# Patient Record
Sex: FEMALE | Race: WHITE | HISPANIC OR LATINO | Employment: OTHER | URBAN - METROPOLITAN AREA
[De-identification: names, ages, dates, MRNs, and addresses within clinical notes are randomized per-mention and may not be internally consistent; named-entity substitution may affect disease eponyms.]

---

## 2018-07-09 ENCOUNTER — TRANSFERRED RECORDS (OUTPATIENT)
Dept: HEALTH INFORMATION MANAGEMENT | Facility: CLINIC | Age: 33
End: 2018-07-09

## 2018-07-12 ENCOUNTER — TRANSFERRED RECORDS (OUTPATIENT)
Dept: HEALTH INFORMATION MANAGEMENT | Facility: CLINIC | Age: 33
End: 2018-07-12

## 2018-07-31 ENCOUNTER — TRANSFERRED RECORDS (OUTPATIENT)
Dept: HEALTH INFORMATION MANAGEMENT | Facility: CLINIC | Age: 33
End: 2018-07-31

## 2018-09-11 ENCOUNTER — OFFICE VISIT (OUTPATIENT)
Dept: CARDIOLOGY | Facility: CLINIC | Age: 33
End: 2018-09-11
Attending: GENETIC COUNSELOR, MS
Payer: COMMERCIAL

## 2018-09-11 ENCOUNTER — TRANSFERRED RECORDS (OUTPATIENT)
Dept: HEALTH INFORMATION MANAGEMENT | Facility: CLINIC | Age: 33
End: 2018-09-11

## 2018-09-11 DIAGNOSIS — Z84.81 FAMILY HISTORY OF CARRIER OF GENETIC DISEASE: ICD-10-CM

## 2018-09-11 DIAGNOSIS — R55 SYNCOPE, CARDIOGENIC: ICD-10-CM

## 2018-09-11 DIAGNOSIS — I44.1 SECOND DEGREE ATRIOVENTRICULAR BLOCK: Primary | ICD-10-CM

## 2018-09-11 PROCEDURE — 96040 ZZH GENETIC COUNSELING, EACH 30 MINUTES: CPT | Mod: ZF | Performed by: GENETIC COUNSELOR, MS

## 2018-09-11 NOTE — MR AVS SNAPSHOT
"              After Visit Summary   2018    Rosana Flores    MRN: [id number]           Patient Information     Date Of Birth          1985        Visit Information        Provider Department      2018 2:30 PM Rufina Cain [facility]         Follow-ups after your visit        Who to contact     If you have questions or need follow up information about today's clinic visit or your schedule please contact [facility] directly at [phone].  Normal or non-critical lab and imaging results will be communicated to you by MyChart, letter or phone within 4 business days after the clinic has received the results. If you do not hear from us within 7 days, please contact the clinic through [employer] or phone. If you have a critical or abnormal lab result, we will notify you by phone as soon as possible.  Submit refill requests through [employer] or call your pharmacy and they will forward the refill request to us. Please allow 3 business days for your refill to be completed.          Additional Information About Your Visit        [employer] Information     [employer] lets you send messages to your doctor, view your test results, renew your prescriptions, schedule appointments and more. To sign up, go to [email or web address] . Click on \"Log in\" on the left side of the screen, which will take you to the Welcome page. Then click on \"Sign up Now\" on the right side of the page.     You will be asked to enter the access code listed below, as well as some personal information. Please follow the directions to create your username and password.     Your access code is: [email or web address]  Expires: 2018  6:30 AM     Your access code will  in 90 days. If you need help or a new code, please call your [facility] clinic or [phone].        Care EveryWhere ID     This is your Care EveryWhere ID. This could be used by other organizations to access your [facility] medical " records  [id number]         Blood Pressure from Last 3 Encounters:   No data found for BP    Weight from Last 3 Encounters:   No data found for Wt              Today, you had the following     No orders found for display       Primary Care Provider Office Phone # Fax #    Deepika Duncan PA-C [phone]       [facility] [address] [facility] [address]        Equal Access to Services     AMELIA LIVE : Hadii aad ku hadasho Soomaali, waaxda luqadaha, qaybta kaalmada adeegyada, waxay idiin hayaan adeeg kharash la'aan ah. So [facility] [phone].    ATENCIÓN: Si habla español, tiene a merritt disposición servicios gratuitos de asistencia lingüística. Llame al [phone].    We comply with applicable federal civil rights laws and [place] laws. We do not discriminate on the basis of race, color, national origin, age, disability, sex, sexual orientation, or gender identity.            Thank you!     Thank you for choosing [facility]  for your care. Our goal is always to provide you with excellent care. Hearing back from our patients is one way we can continue to improve our services. Please take a few minutes to complete the written survey that you may receive in the mail after your visit with us. Thank you!             Your Updated Medication List - Protect others around you: Learn how to safely use, store and throw away your medicines at www.disposemymeds.org.      Notice  As of 9/11/2018 11:59 PM    You have not been prescribed any medications.

## 2018-09-11 NOTE — LETTER
2018      RE: Rosana Flores  [address] [facility] [address] [facility] [address]       Dear Colleague,    Thank you for the opportunity to participate in the care of your patient, Rosana Flores, at the [facility]. Please see a copy of my visit note below.    Here is a copy of the progress note from your recent genetic counseling visit [facility] on Date: 2018.    PROGRESS NOTE: Rosana was referred by Dr. Drummond at [facility] ([facility]) for genetic counseling due to the results of her genetic testing.  I had the opportunity to meet with Rosana today to discuss the genetic test results and the impact for herself and her family.    MEDICAL HISTORY: Rosana reports that she was seen in cardiology at [facility] this spring after experiencing several episodes of syncope.  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  Her history is also  remarkable for a diagnosis of petit mal seizures in childhood, but has not been an issue in years, and depression.      Given the differential diagnosis of underlying channelopathies, history of childhood seizures and family history of psychiatric disorders Dr. Drummond ordered genetic testing which was performed at "[facility] laboratory and revealed variants of unknown significance in the AKAP9, DEPDC5, LAMA4, and MYH7 genes.     FAMILY HISTORY: A detailed family history was obtained at office visit on 2018.  (Please see scanned pedigree for details).  Family history was significant for the following: Rosana's has two children in overall good health.  Her younger daughter has ocular albinism.  Rosana's brother  at 27 years by suicide.  Her younger sister is in good health.  Rosana's father is prediabetic and has high blood pressure. His father  suddenly in this 40's but no details are known about the cause of death.  A paternal uncle  also  in his 40-50's but no details are known.  Rosana's mother had breast cancer in her 20's.  She has mental health issues and an autoimmune disease.  Rosana's maternal uncle  in his 30's after a fall from a roof and resulting blood clot.  Maternal grandmother also had breast cancer.  She was diagnosed in her 40's and  in that decade by suicide. There is no known history of cardiomyopathy, arrythmias, heart attacks, fainting, sudden cardiac death, genetic conditions, or birth defects.     DISCUSSION:  Fainting can be caused by genetic cardiomyopathies and arrhythmias which can result from both genetic and non-genetic factors.  Given the possibility of a genetic component, Rosana had genetic testing which revealed four variants of unknown significance in the AKAP9, DEPDC5, LAMA4, and MYH7 genes.    A variant of unknown significance (VUS) is a genetic change in which there is not enough information known to determine if it is associated with disease or not.  In order to help determine the classification of a variant we look at publications, presence in population database, computer prediction models, and similarity of the change to normal sequence.     AKAP9 (c.5035 C>T) - This gene currently has no well established disease association but there is preliminary evidence that it may be associated with Long QT syndrome.  This change occurs in a position that is moderately conserved across species and creates a large physiochemical difference in the resulting amino acid structure.  It is present in population databases at a low level (0.006%).  Computer models are conflicting, some predict that this change is deleterious, damaging, and unlikely to interfere with function.  Therefore, evidence is insufficient to determine if it is related to disease or not.    DEPDC5 (c.6 A>C) - This gene is associated with familial focal epilepsy and nocturnal frontal lobe epilepsy.  This change occurs in a position that  is highly conserved across species (meaning that it does not change across species) and creates a moderate physiochemical difference in the resulting amino acid structure.  It is present in population databases at a low level (0.01%).  It has been observed in someone with Rolandic Epilepsy.  Computer models are conflicting, some predict that this change is tolerated, not available, and unlikely to interfere with function.  Therefore, evidence is insufficient to determine if it is related to disease or not.    LAMA4 (c.343G>A) - This gene currently has no well established disease association but there is preliminary evidence that it may be associated with dilated cardiomyopathy.  This change occurs in a position that is moderately conserved across species and creates a small physiochemical difference in the resulting amino acid structure.  It is present in population databases at a low level (0.001%).  Computer models are predict that this change is tolerated, benign, and unlikely to interfere with function.  Therefore, evidence is insufficient to determine if it is related to disease or not.    MYH7 (c.658 A>C) - This gene is associated with hypertrophic cardiomyopathy, dilated cardiomyopathy, LV non-compaction cardiomyopathy, Jr distal myopathy, and myosin storage myopathy.  This change occurs in a position that is highly conserved across species and creates a small physiochemical difference in the resulting amino acid structure.  It is not present in population databases.  Computer models are conflicting, some predict that this change is deleterious, probably damaging, and unlikely to interfere with function.  This change occurs in a portion of the gene that has many other known mutations found in individuals with hypertropic cardiomyopathy. Evidence is insufficient to determine if it is related to disease or not but this change seems to be the most concerning of the variants.    These results do not provide  answers regarding Rosana but may give some direction for future screening to assess for cardiomyopathies, LQTS, and neurological assessment for inherited epilepsy.    We know that all of these gene  variants are inherited in an autosomal dominant (AD) pattern. Autosomal refers to the location of the gene on a chromosome shared by both males and females.  Dominant refers to the number of gene mutations required to cause the disease.  In the case of a dominant condition, one of the genes is abnormal or broken and the other is normal.  The one broken gene prevents normal development and leads to the disease.        When an individual with an autosomal dominant condition has children there is a 50% chance of passing on the abnormal gene and a 50% chance of passing along the normal gene to each child. Which gene is passed on is a random process that occurs at the time of conception. If a child does NOT inherit the gene, they are not at risk for passing it on in future generations.      Given these results of this testing, genetic screening is not recommended in family members at this time due to the uncertainty of its significance.  Testing could be offered in Rosana's parents to determine if any of these variants are new in Rosana.  However, these variants do NOT qualify for the complimentary family studies.      Although genetic testing is NOT recommended in family members, we do recommend clinical screening in other family members.  Screening may include ECHO, EKG, heart monitor, and cardiology evaluation.  Details of the recommendations can be discussed with your cardiologist.  Screening in other family members could provide more information about Art's diagnosis.      Reviewed possibility of reduced penetrance and variable expressivity, which could help explain why other family members haven't had the same symptoms as Rosana.  Reduced penetrance means that not everyone who inherits the gene develops symptoms.   Variable expressivity means that the age of onset and severity of disease can differ, even within the same family.        All questions answered at this time.     PLAN: Rosana plans to continue care with Dr. Drummond and other providers at [facility].  Given the uncertainty of her diagnosis, she plans to also meet with a neurologist to see if any of her symptoms are related to her history of petit mal seizures.  At that time she will also review her genetic test results with the.  She will notify me of any updates. A copy of lab results were provided to Rosana.    TOTAL TIME SPENT IN COUNSELIN minutes    Rufina Cain MS  Licensed Genetic Counselor  [facility]

## 2018-09-12 NOTE — PROGRESS NOTES
Here is a copy of the progress note from your recent genetic counseling visit to [facility] on Date: 2018.    PROGRESS NOTE: Rosana was referred by Dr. Drummond at [facility] ([facility]) for genetic counseling due to the results of her genetic testing.  I had the opportunity to meet with Rosana today to discuss the genetic test results and the impact for herself and her family.    MEDICAL HISTORY: Rosana reports that she was seen in cardiology at [facility] this spring after experiencing several episodes of syncope.  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  Her history is also  remarkable for a diagnosis of petit mal seizures in childhood, but has not been an issue in years, and depression.      Given the differential diagnosis of underlying channelopathies, history of childhood seizures and family history of psychiatric disorders Dr. Drummond ordered genetic testing which was performed at [email or web address] and revealed variants of unknown significance in the AKAP9, DEPDC5, LAMA4, and MYH7 genes.     FAMILY HISTORY: A detailed family history was obtained at office visit on 2018.  (Please see scanned pedigree for details).  Family history was significant for the following: Adis has two children in overall good health.  Her younger daughter has ocular albinism.  Rosana's brother  at 27 years by suicide.  Her younger sister is in good health.  Rosana's father is prediabetic and has high blood pressure. His father  suddenly in this 40's but no details are known about the cause of death.  A paternal uncle also  in his 40-50's but no details are known.  Rosana's mother had breast cancer in her 20's.  She has mental health issues and an autoimmune disease.  Rosana's maternal uncle  in his 30's after a fall from a roof and resulting blood clot.  Maternal grandmother also had breast cancer.  She was diagnosed  in her 40's and  in that decade by suicide. There is no known history of cardiomyopathy, arrythmias, heart attacks, fainting, sudden cardiac death, genetic conditions, or birth defects.     DISCUSSION:  Fainting can be caused by genetic cardiomyopathies and arrhythmias which can result from both genetic and non-genetic factors.  Given the possibility of a genetic component, Rosana had genetic testing which revealed four variants of unknown significance in the AKAP9, DEPDC5, LAMA4, and MYH7 genes.    A variant of unknown significance (VUS) is a genetic change in which there is not enough information known to determine if it is associated with disease or not.  In order to help determine the classification of a variant we look at publications, presence in population database, computer prediction models, and similarity of the change to normal sequence.     AKAP9 (c.5035 C>T) - This gene currently has no well established disease association but there is preliminary evidence that it may be associated with Long QT syndrome.  This change occurs in a position that is moderately conserved across species and creates a large physiochemical difference in the resulting amino acid structure.  It is present in population databases at a low level (0.006%).  Computer models are conflicting, some predict that this change is deleterious, damaging, and unlikely to interfere with function.  Therefore, evidence is insufficient to determine if it is related to disease or not.    DEPDC5 (c.2066 A>C) - This gene is associated with familial focal epilepsy and nocturnal frontal lobe epilepsy.  This change occurs in a position that is highly conserved across species (meaning that it does not change across species) and creates a moderate physiochemical difference in the resulting amino acid structure.  It is present in population databases at a low level (0.01%).  It has been observed in someone with Rolandic Epilepsy.  Computer models are  conflicting, some predict that this change is tolerated, not available, and unlikely to interfere with function.  Therefore, evidence is insufficient to determine if it is related to disease or not.    LAMA4 (c.343G>A) - This gene currently has no well established disease association but there is preliminary evidence that it may be associated with dilated cardiomyopathy.  This change occurs in a position that is moderately conserved across species and creates a small physiochemical difference in the resulting amino acid structure.  It is present in population databases at a low level (0.001%).  Computer models are predict that this change is tolerated, benign, and unlikely to interfere with function.  Therefore, evidence is insufficient to determine if it is related to disease or not.    MYH7 (c.658 A>C) - This gene is associated with hypertrophic cardiomyopathy, dilated cardiomyopathy, LV non-compaction cardiomyopathy, Jr distal myopathy, and myosin storage myopathy.  This change occurs in a position that is highly conserved across species and creates a small physiochemical difference in the resulting amino acid structure.  It is not present in population databases.  Computer models are conflicting, some predict that this change is deleterious, probably damaging, and unlikely to interfere with function.  This change occurs in a portion of the gene that has many other known mutations found in individuals with hypertropic cardiomyopathy. Evidence is insufficient to determine if it is related to disease or not but this change seems to be the most concerning of the variants.    These results do not provide answers regarding Rosana but may give some direction for future screening to assess for cardiomyopathies, LQTS, and neurological assessment for inherited epilepsy.    We know that all of these gene variants are inherited in an autosomal dominant (AD) pattern. Autosomal refers to the location of the gene on a  chromosome shared by both males and females.  Dominant refers to the number of gene mutations required to cause the disease.  In the case of a dominant condition, one of the genes is abnormal or broken and the other is normal.  The one broken gene prevents normal development and leads to the disease.        When an individual with an autosomal dominant condition has children there is a 50% chance of passing on the abnormal gene and a 50% chance of passing along the normal gene to each child. Which gene is passed on is a random process that occurs at the time of conception. If a child does NOT inherit the gene, they are not at risk for passing it on in future generations.      Given these results of this testing, genetic screening is not recommended in family members at this time due to the uncertainty of its significance.  Testing could be offered in Rosana's parents to determine if any of these variants are new in Rosana.  However, these variants do NOT qualify for the complimentary family studies.      Although genetic testing is NOT recommended in family members, we do recommend clinical screening in other family members.  Screening may include ECHO, EKG, heart monitor, and cardiology evaluation.  Details of the recommendations can be discussed with your cardiologist.  Screening in other family members could provide more information about Art's diagnosis.      Reviewed possibility of reduced penetrance and variable expressivity, which could help explain why other family members haven't had the same symptoms as Rosana.  Reduced penetrance means that not everyone who inherits the gene develops symptoms.  Variable expressivity means that the age of onset and severity of disease can differ, even within the same family.        All questions answered at this time.     PLAN: Rosana plans to continue care with Dr. Drummond and other providers at [facility].  Given the uncertainty of her diagnosis, she plans to also meet with a  neurologist to see if any of her symptoms are related to her history of petit mal seizures.  At that time she will also review her genetic test results with the.  She will notify me of any updates. A copy of lab results were provided to Rosana.    TOTAL TIME SPENT IN COUNSELIN minutes    Rufina Cain MS  Licensed Genetic Counselor  [facility]

## 2021-05-28 ENCOUNTER — RECORDS - HEALTHEAST (OUTPATIENT)
Dept: ADMINISTRATIVE | Facility: CLINIC | Age: 36
End: 2021-05-28

## 2021-05-31 ENCOUNTER — RECORDS - HEALTHEAST (OUTPATIENT)
Dept: ADMINISTRATIVE | Facility: CLINIC | Age: 36
End: 2021-05-31

## 2021-06-01 ENCOUNTER — RECORDS - HEALTHEAST (OUTPATIENT)
Dept: ADMINISTRATIVE | Facility: CLINIC | Age: 36
End: 2021-06-01

## 2021-06-02 ENCOUNTER — RECORDS - HEALTHEAST (OUTPATIENT)
Dept: ADMINISTRATIVE | Facility: CLINIC | Age: 36
End: 2021-06-02

## 2021-06-16 PROBLEM — I95.1 ORTHOSTATIC HYPOTENSION: Status: ACTIVE | Noted: 2018-09-24

## 2021-06-16 PROBLEM — F90.2 ADHD (ATTENTION DEFICIT HYPERACTIVITY DISORDER), COMBINED TYPE: Status: ACTIVE | Noted: 2018-10-01

## 2021-06-16 PROBLEM — I49.5 TACHY-BRADY SYNDROME (H): Status: ACTIVE | Noted: 2018-09-24

## 2021-06-16 PROBLEM — Z00.00 ROUTINE PHYSICAL EXAMINATION: Status: ACTIVE | Noted: 2017-07-19

## 2021-06-16 PROBLEM — I44.1 AV BLOCK, 2ND DEGREE: Status: ACTIVE | Noted: 2018-05-25

## 2021-06-16 PROBLEM — Z95.0 PACEMAKER: Status: ACTIVE | Noted: 2018-07-09

## 2021-06-16 PROBLEM — R07.9 CHEST PAIN: Status: ACTIVE | Noted: 2018-09-24

## 2021-06-16 PROBLEM — Z97.5 IUD (INTRAUTERINE DEVICE) IN PLACE: Status: ACTIVE | Noted: 2017-07-19

## 2021-06-16 PROBLEM — R55 VASOVAGAL SYNCOPE: Status: ACTIVE | Noted: 2018-05-25

## 2021-06-16 PROBLEM — F32.A DEPRESSION: Status: ACTIVE | Noted: 2018-05-25

## 2022-11-15 ENCOUNTER — HOSPITAL ENCOUNTER (EMERGENCY)
Facility: CLINIC | Age: 37
Discharge: HOME | End: 2022-11-15
Attending: EMERGENCY MEDICINE | Admitting: EMERGENCY MEDICINE

## 2022-11-15 ENCOUNTER — APPOINTMENT (OUTPATIENT)
Dept: RADIOLOGY | Facility: CLINIC | Age: 37
End: 2022-11-15
Attending: EMERGENCY MEDICINE

## 2022-11-15 VITALS
HEART RATE: 71 BPM | DIASTOLIC BLOOD PRESSURE: 69 MMHG | BODY MASS INDEX: 28.17 KG/M2 | RESPIRATION RATE: 16 BRPM | OXYGEN SATURATION: 97 % | SYSTOLIC BLOOD PRESSURE: 109 MMHG | TEMPERATURE: 99.3 F | WEIGHT: 165 LBS | HEIGHT: 64 IN

## 2022-11-15 DIAGNOSIS — M25.511 ACUTE PAIN OF RIGHT SHOULDER: ICD-10-CM

## 2022-11-15 LAB
ANION GAP SERPL CALCULATED.3IONS-SCNC: 8 MMOL/L (ref 5–18)
ATRIAL RATE - MUSE: 76 BPM
BASOPHILS # BLD AUTO: 0.1 10E3/UL (ref 0–0.2)
BASOPHILS NFR BLD AUTO: 1 %
BUN SERPL-MCNC: 15 MG/DL (ref 8–22)
CALCIUM SERPL-MCNC: 9.2 MG/DL (ref 8.5–10.5)
CHLORIDE BLD-SCNC: 105 MMOL/L (ref 98–107)
CO2 SERPL-SCNC: 25 MMOL/L (ref 22–31)
CREAT SERPL-MCNC: 0.76 MG/DL (ref 0.6–1.1)
DIASTOLIC BLOOD PRESSURE - MUSE: NORMAL MMHG
EOSINOPHIL # BLD AUTO: 0.4 10E3/UL (ref 0–0.7)
EOSINOPHIL NFR BLD AUTO: 4 %
ERYTHROCYTE [DISTWIDTH] IN BLOOD BY AUTOMATED COUNT: 11.7 % (ref 10–15)
GFR SERPL CREATININE-BSD FRML MDRD: >90 ML/MIN/1.73M2
GLUCOSE BLD-MCNC: 89 MG/DL (ref 70–125)
HCG SERPL QL: NEGATIVE
HCT VFR BLD AUTO: 45.9 % (ref 35–47)
HGB BLD-MCNC: 15.7 G/DL (ref 11.7–15.7)
HOLD SPECIMEN: NORMAL
IMM GRANULOCYTES # BLD: 0 10E3/UL
IMM GRANULOCYTES NFR BLD: 0 %
INTERPRETATION ECG - MUSE: NORMAL
LYMPHOCYTES # BLD AUTO: 2.1 10E3/UL (ref 0.8–5.3)
LYMPHOCYTES NFR BLD AUTO: 21 %
MCH RBC QN AUTO: 31 PG (ref 26.5–33)
MCHC RBC AUTO-ENTMCNC: 34.2 G/DL (ref 31.5–36.5)
MCV RBC AUTO: 91 FL (ref 78–100)
MONOCYTES # BLD AUTO: 0.4 10E3/UL (ref 0–1.3)
MONOCYTES NFR BLD AUTO: 4 %
NEUTROPHILS # BLD AUTO: 7.3 10E3/UL (ref 1.6–8.3)
NEUTROPHILS NFR BLD AUTO: 70 %
NRBC # BLD AUTO: 0 10E3/UL
NRBC BLD AUTO-RTO: 0 /100
P AXIS - MUSE: 56 DEGREES
PLATELET # BLD AUTO: 224 10E3/UL (ref 150–450)
POTASSIUM BLD-SCNC: 4.8 MMOL/L (ref 3.5–5)
PR INTERVAL - MUSE: 186 MS
QRS DURATION - MUSE: 82 MS
QT - MUSE: 364 MS
QTC - MUSE: 409 MS
R AXIS - MUSE: 23 DEGREES
RBC # BLD AUTO: 5.07 10E6/UL (ref 3.8–5.2)
SODIUM SERPL-SCNC: 138 MMOL/L (ref 136–145)
SYSTOLIC BLOOD PRESSURE - MUSE: NORMAL MMHG
T AXIS - MUSE: 38 DEGREES
TROPONIN I SERPL-MCNC: 0.01 NG/ML (ref 0–0.29)
VENTRICULAR RATE- MUSE: 76 BPM
WBC # BLD AUTO: 10.3 10E3/UL (ref 4–11)

## 2022-11-15 PROCEDURE — 99285 EMERGENCY DEPT VISIT HI MDM: CPT | Mod: 25

## 2022-11-15 PROCEDURE — 80048 BASIC METABOLIC PNL TOTAL CA: CPT | Performed by: EMERGENCY MEDICINE

## 2022-11-15 PROCEDURE — 85025 COMPLETE CBC W/AUTO DIFF WBC: CPT | Performed by: EMERGENCY MEDICINE

## 2022-11-15 PROCEDURE — 36415 COLL VENOUS BLD VENIPUNCTURE: CPT | Performed by: EMERGENCY MEDICINE

## 2022-11-15 PROCEDURE — 71046 X-RAY EXAM CHEST 2 VIEWS: CPT

## 2022-11-15 PROCEDURE — 93005 ELECTROCARDIOGRAM TRACING: CPT | Performed by: EMERGENCY MEDICINE

## 2022-11-15 PROCEDURE — 84703 CHORIONIC GONADOTROPIN ASSAY: CPT | Performed by: EMERGENCY MEDICINE

## 2022-11-15 PROCEDURE — 84484 ASSAY OF TROPONIN QUANT: CPT | Performed by: EMERGENCY MEDICINE

## 2022-11-15 NOTE — ED TRIAGE NOTES
Pt reports around 1300, she began to have pain to posterior R shoulder which is radiating down R arm. Pain is worse when using R arm. Pt concerned because she has a pacemaker.     Triage Assessment     Row Name 11/15/22 4961       Triage Assessment (Adult)    Airway WDL WDL       Respiratory WDL    Respiratory WDL WDL       Skin Circulation/Temperature WDL    Skin Circulation/Temperature WDL WDL       Cardiac WDL    Cardiac WDL WDL       Peripheral/Neurovascular WDL    Peripheral Neurovascular WDL WDL       Cognitive/Neuro/Behavioral WDL    Cognitive/Neuro/Behavioral WDL WDL

## 2022-11-15 NOTE — ED PROVIDER NOTES
EMERGENCY DEPARTMENT ENCOUNTER      NAME: Rosana Flores  AGE: 37 year old female  YOB: 1985  MRN: [id number]  EVALUATION DATE & TIME: No admission date for patient encounter.    PCP: System, Provider Not In    ED PROVIDER: Slade Mckinney M.D.      Chief Complaint   Patient presents with     Arm Pain       FINAL IMPRESSION:  1. Acute pain of right shoulder        ED COURSE & MEDICAL DECISION MAKIN year old female presents to the Emergency Department for evaluation of  right shoulder and upper back pain.  She is vitally stable and well-appearing when she arrives to the emergency department.  Normal cardiopulmonary and neurological exams in the upper extremities.  She works as a hairdresser and probably has a repetitive strain type injury of her right shoulder musculature.  Screening EKG, chest x-ray, and lab testing is negative including for any kind of referred exceedingly atypical ACS.  EKG shows atrial paced rhythm.  We discussed supportive management with NSAIDs and rest.  Patient was in agreement with this and can follow-up any lingering concerns in clinic.    5:00 PM I met with the patient, obtained history, performed an initial exam, and discussed options and plan for diagnostics and treatment here in the ED. PPE worn: n95 mask and nitrile gloves.     Medical Decision Making    History:    Supplemental history from: N/A    External Record(s) reviewed: N/A (Details documented in chart).    Work Up:    Chart documentation includes differential considered and any EKGs or imaging interpreted by provider.    In additional to work up documented, I considered the following work up: See chart documentation, if applicable.    External consultation:    Discussion of management with another provider: See chart documentation, if applicable    Discussed with radiology regarding test interpretation: N/A    Complicating factors:    Care impacted by chronic illness: None    Care affected by social  "determinants of health: N/A    Disposition considerations: Discharge. No recommendations on prescription medications. I did not consider admission.        MEDICATIONS GIVEN IN THE EMERGENCY:  Medications - No data to display    NEW PRESCRIPTIONS STARTED AT TODAY'S ER VISIT  Discharge Medication List as of 11/15/2022  5:35 PM             =================================================================    HPI    Patient information was obtained from: patient     Use of : N/A         Rosana Flores is a 37 year old female with a pertinent history of AV block, 2nd degree, pacemaker in place, tachy-pratibha syndrome, ADHD, who presents to this ED via walk in for evaluation of right sided arm pain.    Patient here with sudden onset of right shoulder pain that radiates down to her right arm just below the elbow. Pain is worse when she gets up and moves but when she is sitting still it subsides. Initially the pain was intense but then now feels \"dull\". Currently, her pain is 5-6/10 in severity. She does have a pacemaker in place for the past 7 years. No shortness of breath, leg swelling, chest pain, fever, or cough. No other medical complaints at this time.       REVIEW OF SYSTEMS   All systems reviewed and negative except as noted in HPI.    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     Current Outpatient Medications   Medication     lanolin (LANSINOH HPA) 100 % Oint     prenatal vitamin iron-folic acid 27mg-0.8mg (PRENATAL S) 27 mg iron- 800 mcg Tab tablet         ALLERGIES:  Allergies   Allergen Reactions     Naproxen Hives       FAMILY HISTORY:  History reviewed. No pertinent family history.    SOCIAL HISTORY:   Social History     Socioeconomic History     Marital status: Single   Tobacco Use     Smoking status: Never     Smokeless tobacco: Never   Substance and Sexual Activity     Alcohol use: No     Drug use: No     Sexual activity: Yes     Partners: Male       VITALS:  /69   Pulse 71   Temp 99.3  F (37.4  C) (Temporal)   Resp 16   Ht 1.626 m (5' 4\")   Wt 74.8 kg (165 lb)   SpO2 97%   BMI 28.32 kg/m      PHYSICAL EXAM    Constitutional: Well developed, Well nourished, NAD.  HENT: Normocephalic, Atraumatic. Neck Supple.  Eyes: EOMI, Conjunctiva normal.  Respiratory: Breathing comfortably on room air. Speaks full sentences easily. Lungs clear to ascultation.  Cardiovascular: Normal heart rate, Regular rhythm. No peripheral edema.  Abdomen: Soft  Musculoskeletal: Good range of motion in all major joints. No major deformities noted.  Integument: Warm, Dry.  Neurologic: Alert & awake, Normal motor function, Normal sensory function, No focal deficits noted.  Strength is 5 out of 5 throughout bilateral upper extremities with normal sensation to light touch throughout  Psychiatric: Cooperative. Affect appropriate.     LAB:  All pertinent labs reviewed and interpreted.  Labs Ordered and Resulted from Time of ED Arrival to Time of ED Departure   BASIC METABOLIC PANEL - Normal       Result Value    Sodium 138      Potassium 4.8      Chloride 105      Carbon Dioxide (CO2) 25      Anion Gap 8      Urea Nitrogen 15      Creatinine 0.76      Calcium 9.2      Glucose 89      GFR Estimate >90     TROPONIN I - Normal    Troponin I 0.01     HCG QUALITATIVE PREGNANCY - Normal "    hCG Serum Qualitative Negative     CBC WITH PLATELETS AND DIFFERENTIAL    WBC Count 10.3      RBC Count 5.07      Hemoglobin 15.7      Hematocrit 45.9      MCV 91      MCH 31.0      MCHC 34.2      RDW 11.7      Platelet Count 224      % Neutrophils 70      % Lymphocytes 21      % Monocytes 4      % Eosinophils 4      % Basophils 1      % Immature Granulocytes 0      NRBCs per 100 WBC 0      Absolute Neutrophils 7.3      Absolute Lymphocytes 2.1      Absolute Monocytes 0.4      Absolute Eosinophils 0.4      Absolute Basophils 0.1      Absolute Immature Granulocytes 0.0      Absolute NRBCs 0.0         RADIOLOGY:  Reviewed all pertinent imaging. Please see official radiology report.  Chest XR,  PA & LAT   Final Result   IMPRESSION: Left-sided pacemaker. The leads appear unchanged. The heart is normal in size. No focal infiltrate, pleural effusion, or pneumothorax is identified. Bilateral nipple rings are present.          EKG:    Performed at: 1530    Impression: Atrial paced rhythm, low voltage QRS    Rate: 76  Rhythm: Atrial paced  Axis: Normal  NM Interval: 186  QRS Interval: 82  QTc Interval: 409  ST Changes: None  Comparison: Compared to March 31, 2021, electronic atrial pacemaker has replaced sinus rhythm    I have independently reviewed and interpreted the EKG(s) documented above.    PROCEDURES:   None      I, Hailey Olson, am serving as a scribe to document services personally performed by Dr. Slade Mckinney, based on my observation and the provider's statements to me. I, Slade Mckinney MD attest that Hailey Olson is acting in a scribe capacity, has observed my performance of the services and has documented them in accordance with my direction.    Slade Mckinney M.D.  Emergency Medicine  [facility] EMERGENCY ROOM  [address] [facility] [address] [id number] [phone]  Dept: [phone]     Slade Mckinney MD  11/15/22 [id number]

## 2022-11-15 NOTE — DISCHARGE INSTRUCTIONS
You were seen in the emergency department for right upper back and arm pain.  Your evaluation included an EKG and blood testing related to your heart which looked normal.  Your x-ray also showed clear lungs and no issues in the area where you are having discomfort.  We agree that your symptoms are probably related to a paraspinal/trapezius muscle strain or potentially some inflammation around the nerve roots exiting the neck on the right side.  This kind of pain usually responds well to scheduling anti-inflammatory medication like ibuprofen 600 mg every 6 hours and you can also use topical agents like over-the-counter 4% lidocaine patches to help with pain.  Please avoid strenuous activity with the affected arm is much as possible for the next couple of days while you rest and recover.  We do expect to be feeling better soon.  For any lingering concerns more than a week please contact the clinic to arrange for follow-up.

## 2024-02-23 ENCOUNTER — LAB REQUISITION (OUTPATIENT)
Dept: LAB | Facility: CLINIC | Age: 39
End: 2024-02-23

## 2024-02-23 DIAGNOSIS — O02.1 MISSED ABORTION: ICD-10-CM

## 2024-02-23 LAB
ABO/RH(D): NORMAL
ANTIBODY SCREEN: NEGATIVE
SPECIMEN EXPIRATION DATE: NORMAL

## 2024-02-23 PROCEDURE — 86900 BLOOD TYPING SEROLOGIC ABO: CPT | Performed by: STUDENT IN AN ORGANIZED HEALTH CARE EDUCATION/TRAINING PROGRAM

## 2024-02-23 PROCEDURE — 84702 CHORIONIC GONADOTROPIN TEST: CPT | Performed by: STUDENT IN AN ORGANIZED HEALTH CARE EDUCATION/TRAINING PROGRAM

## 2024-02-24 LAB — HCG INTACT+B SERPL-ACNC: 1 MIU/ML

## 2025-03-28 ENCOUNTER — MEDICAL CORRESPONDENCE (OUTPATIENT)
Dept: HEALTH INFORMATION MANAGEMENT | Facility: CLINIC | Age: 40
End: 2025-03-28

## 2025-03-28 ENCOUNTER — TRANSFERRED RECORDS (OUTPATIENT)
Dept: HEALTH INFORMATION MANAGEMENT | Facility: CLINIC | Age: 40
End: 2025-03-28

## 2025-03-31 DIAGNOSIS — O26.90 PREGNANCY RELATED CONDITION: Primary | ICD-10-CM

## 2025-04-01 ENCOUNTER — TELEPHONE (OUTPATIENT)
Dept: CARDIOLOGY | Facility: CLINIC | Age: 40
End: 2025-04-01

## 2025-05-10 NOTE — PROGRESS NOTES
Maternal-Fetal Medicine Consultation     Rosana Flores  : 1985  MRN: [id number]    Rerferral:  Rosana Flores is a 39 year old sent by Dr. Jacky Stewart from [facility] for MFM consultation.    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    She is accompanied by her two daughters.     She reports overall doing well. Denies chest pain, shortness of breath, presyncope/syncopal, orthopnea, or palpitations. She actually states she has never felt better in pregnancy, especially compared to her previous pregnancies.     Otherwise, she also denies vaginal bleeding, vaginal leakage of fluid, contractions or cramping. Reports normal fetal movement.     Prenatal Care:  Primary OB care this pregnancy has been with Dr. Jacky Stewart from [facility].     Background     Ms. Flores has a significant history of paroxysmal high-grade AV block resulting in syncope. Her symptoms began with recurrent lightheadedness, pre-syncope, and at least two documented syncopal episodes. A Holter monitor in May 2018 demonstrated high-grade AV block with intermittent 2:1 and 3:1 AV block. A comprehensive cardiac workup was performed, including an echocardiogram that showed normal left ventricular size and function with an EF of 68% and no significant valvular abnormalities. Laboratory testing including Lyme titer, CRP/ESR, ACE level, JOLLY, and ANCA were all negative.    Her history is also remarkable for a diagnosis of petit mal seizures in childhood, which has not been an issue in years, and depression. Genetic testing was performed at [facility] and revealed variants of unknown significance in the  AKAP9, DEPDC5, LAMA4, and MYH7 genes. After genetic counseling, it was determined that while these variants might potentially be associated with Long QT syndrome, familial focal epilepsy, dilated cardiomyopathy, and hypertrophic cardiomyopathy respectively, there was insufficient evidence to determine if they were related to her clinical presentation.There is no known family history of cardiomyopathy, arrhythmias, heart attacks, fainting, sudden cardiac death, genetic conditions, or birth defects.    On 5/27/2018, Ms. Flores underwent placement of a single-chamber ventricular pacemaker for 2nd degree AV block and syncope by Dr. Real Drummond. Unfortunately, post-operatively she continued to have presyncopal symptoms occurring often, reporting dizziness and lightheadedness as many as 3-4 times a day. Device check showed ventricular pacing less than 1%, and narrow complex tachycardia. She was started on fludrocortisone (Florinef) and encouraged to increase salt and fluid intake, as well as use LATOYA stockings. Despite these interventions, she was admitted to [facility] on 9/24/2018 following another syncopal episode. Pacemaker interrogation showed sinus tachycardia prior to the syncopal spell. This was suspected to be a sympathetic episode prior to her parasympathetic event (she vomited prior to syncope). After discussion with Dr. Duffy and Dr. Drummond, it was determined that her symptoms were consistent with neurocardiogenic (vasovagal) episodes, and a device upgrade was recommended. As such, on 12/14/2018, she underwent an upgrade to a BIOTRONIK dual-chamber pacemaker with Closed Loop Stimulation (CLS) technology with Dr. Real Drummond, which is specifically designed to respond to neurocardiogenic syncope.    Following the pacemaker upgrade, her clinical status improved significantly. By April 2019, she reported no further episodes of passing out, though she would still occasionally experience  prodromal symptoms that would allow her to sit down quickly and prevent full syncope. Her device interrogation at that time showed stable lead measurements and battery status, with some episodes of supraventricular tachycardia (SVT). By 2021, during an in-person visit with Daiana Linn NP, once again she continued to report no episodes of passing out, though she would occasionally experience prodromal symptoms that she could recognize and respond to by sitting down. Her quality of life had improved overall. Device interrogation showed 37% atrial pacing, with some short episodes of asymptomatic atrial tachycardia. Since then, regular device checks have continued through , with the most recent device check performed on 4/15/2024. Her last visit with Cardiology on file was the aforementioned encounter stated earlier on 2021 with Daiana Linn NP.    She is also being treated for depression with fluoxetine (Prozac). She has had longstanding depression going as far back as 2018 per medical chart review.     Her history of gestational diabetes mellitus (GDM) occurred with her  pregnancy. Her GDM was diet controlled (GDMA1) at that time. She ultimately presented in labor at 39w4d on 2016 and had a successful vaginal delivery. She has had a Hgb A1c early in this pregnancy which was normal at 5.0% on 2025.    OB History    Para Term  AB Living   7 2 2 0 4 2   SAB IAB Ectopic Multiple Live Births   2 1 1 0 2      # Outcome Date GA Lbr Tristen/2nd Weight Sex Type Anes PTL Lv   7 Current            6 2024           5 2018           4 Term 16 40w1d 05:00 / 00:43 3.402 kg (7 lb 8 oz) F Vag-Spont EPI N ROB      Apgar1: 8  Apgar5: 9   3 Term 09 42w0d   F Vag-Spont  N ROB      Name: Maeve   2 IAB 2007           1 Ectopic 2006               Past Medical History     Past Medical History:   Diagnosis Date    ADHD (attention deficit hyperactivity disorder), combined type 10/1/2018     "AV block, 2nd degree 5/25/2018    IUD (intrauterine device) in place 7/19/2017    Overview:  Mirena IUD insertion: Brooke Pinedavamshi Lambert, NP-BC 7/19/2017    Migraine     Orthostatic hypotension 9/24/2018    Petit mal seizure status (H)     history as a child    Syncope     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       No current facility-administered medications for this visit.       Allergies   Latex, Indomethacin, and Naproxen    Social History     Social History     Social History Narrative    Not on file     Family History     Family History       No data available             Review of Systems   10-point ROS negative except as in HPI.    Physical Exam   BP 99/61 (BP Location: Right arm, Patient Position: Sitting, Cuff Size: Adult Regular)   Pulse 80   Ht 1.6 m (5' 3\")   Wt 89.4 kg (197 lb 3 oz)   LMP 12/10/2024   SpO2 100%   BMI 34.93 kg/m      Gen: NAD  CV: RRR  Resp: CTAB  Abd: deferred  Ext: deferred    Ultrasound   See today's ultrasound report under the \"imaging\" tab.    [facility] US Comprehensive Single 5/13/2025  GA 18w4d. EFW 270g (71%). AC 51%. MVP 4.1 cm. Anterior placenta; no previa. Three vessel umbilical cord.   Transabdominal cervical length 38.2 mm. Left ovarian cyst 21.0 mm x 25.0 mm x 28.0 mm   No fetal anomalies commonly detected by ultrasound were identified in the detailed fetal anatomic survey within the limits of prenatal ultrasound, however some views were suboptimal due to fetal position. Views not adequately seen was 3-vessel view, Ductal arch view, Aortic " arch view. Bicaval view. Superior vena cava and Inferior Vena Cava.    Other Imaging     CARDIAC MRI WITH AND WITHOUT CONTRAST 2018  CONCLUSIONS:   1. Technically challenging cardiac MRI due to pacemaker related artifact.   2.  Normal left ventricular chamber size and systolic function. Estimated   left ventricular ejection fraction was 60% to 65%.   3.  Normal left ventricular wall thickness.   4.  Normal right ventricular size and systolic function.   5.  No significant valvular heart disease.   6.  No evidence of edema. Sensitive images do not demonstrate any evidence   of myocardial edema.   7.  Delayed gadolinium enhancement images do not demonstrate any evidence   of myocardial infarction, fibrosis or myocarditis.   8.  No pericardial effusion.     ECHO COMPLETE W CONTRAST 2018  Final Conclusion   1. Normal left ventricular chamber size. Normal left ventricular systolic function. Normal left ventricular wall thickness. Calculated left ventricular ejection fraction (modified Walker technique) is 68 %.  No regional wall motion abnormalities.   2. Normal right ventricular chamber size.  Normal right ventricular systolic function.   3. No significant valvular heart disease.   4. No pericardial effusion.   5. There were no prior studies available for comparison. Estimated EF: 65%       Labs     - Prenatal labs (2025):  Rh neg (B neg), antibody negative  Hgb 16.1 MCV 94 Plt 210K   HepB/HIV/RPR/HepC: negative   GC/CT: negative   Rubella: immune     UC: Mixed urogenital sonam; >100K colony forming units/mL               Hgb A1c 5.0%, TSH 2.27               Ferritin 53 ng/ml ()  - Pap smear: not on file  - Aneuploidy screening: Low risk cell free DNA (3/8/2025)  - Carrier Screen: Nephrotic Syndrome, NPHS2-related (3/8/2025)     Assessment/Counseling     Rosana Flores is a 39 year old  at 18w4d by 5w5d US here for [facility] consultation regarding history of 2nd degree atrioventricular block  with dual chamber pacemaker in place, advanced maternal age, history of depression, history of gestational diabetes and obesity (BMI >30).    History of 2nd degree atrioventricular (AV) block with dual chamber pacemaker    We reviewed with Ms. Flores that  pregnancy causes significant physiological changes to the cardiovascular system that need to be monitored, particularly with pacemaker in place. During pregnancy, blood volume will increase by approximately 50%, and cardiac output will increase by about 30-40%. Initially, this increase in cardiac output is primarily due to increased stroke volume, but as pregnancy progresses, it is more related to increased heart rate. Her dual chamber Biotronik CLS pacemaker is well-suited to adapt to these pregnancy-related changes, as it can detect variations in cardiac contractility and respond appropriately.    We discussed that in her particular situation, with a dual chamber CLS pacemaker for high-grade AV block, pregnancy itself poses minimal direct risks to pacemaker function. The device will continue to work properly throughout your pregnancy. However, it is essential to monitor how well the pacemaker is adapting to the increased cardiovascular demands of pregnancy. As such,  regular pacemaker interrogations/checks would need to be scheduled more frequently during pregnancy to ensure optimal function and adjust settings if necessary.This would have to be arranged with an electrophysiology cardiologist.     Individuals with pacemakers generally tolerate pregnancy well, but those with underlying structural heart disease may face higher risks. Based on Ms. Flores's medical records, her echocardiogram from 2018 showed normal left ventricular function without significant structural abnormalities, which is reassuring, but would need to be repeated to ensure that it remains normal. Her greatest risk would be if her cardiac output cannot keep pace with the physiological  demands of pregnancy, potentially leading to fatigue, shortness of breath, or dizziness.    We reassured Ms. Flores that her pacemaker poses no direct risks to her developing baby. The electromagnetic fields generated by the pacemaker are too weak and localized to affect the fetus. Her history of genetic variants of uncertain significance (AKAP9, DEPDC5, LAMA4, and MYH7) has been evaluated, and while there is potential association with various cardiac conditions, there was insufficient evidence to determine clinical significance.     For delivery, vaginal delivery is typically not contraindicated with a pacemaker with  delivery reserved for routine obstetric indications and not for the presence of the pacemaker. While continuous telemetry can be considered during her labor, alternatively, it can be avoided and immediate electrocardiogram requested if she were to convey symptoms of palpitations. If epidural anesthesia is used, then the anesthesiologist will need to monitor for potential hypotension, as her pacemaker's ability to compensate with increased heart rate may be different from a normal physiological response. Her CLS pacemaker offers advantages here, as it can detect changes in contractility rather than just relying on activity sensors. If  section is needed, the surgical team will need to be aware of her pacemaker. Electrocautery devices should be used with caution, preferably using bipolar rather than unipolar cautery, and the grounding pad should be positioned away from her pacemaker.    Following delivery, the cardiovascular system will gradually return to its pre-pregnancy state over approximately 6-12 weeks. Monitoring of her pacemaker during this transition is vital and settings may need to be readjusted. This can be facilitated by her established electrophysiology cardiology team.     History of gestational diabetes mellitus     We reviewed that although gestational diabetes  typically resolves after delivery approximately 1/3 of patients will have diabetes or impaired glucose metabolism at the time of their 6 week postpartum screening. Additionally, even if this screening is normal patients with a history of gestational diabetes have a 7-fold increased risk of developing Type 2 Diabetes later in life. We reviewed that due to this elevated risk we would recommend assessment of glycemic status (hemoglobin A1c) every 1-3 years. Because there is also an increased risk of gestational diabetes in subsequent pregnancies, a hemoglobin A1c at the beginning of pregnancy and possible early glucose screening based on this result is recommended in addition to routine screening between 24-28 weeks.    In the case of Ms. Flores, her Hgb A1c at the beginning of the pregnancy was normal, indicating that she does not have preexisting/prepregnancy diabetes mellitus.     Depression    Upon review of Ms. Flores's psychiatric history, we have identified that her pre-existing depression represent significant risk factors for exacerbation during both the antepartum and postpartum periods. Evidence demonstrates that untreated depression during pregnancy significantly increases the risk of postpartum depression. We conducted a comprehensive discussion regarding symptom recognition and preventative strategies, emphasizing the importance of establishing a robust psychosocial support network and maintaining consistent engagement with mental health professionals throughout the  period.     After careful consideration of risk-benefit analysis, we recommend continuation of her current psychotropic medication regimen, fluoxetine (Prozac), as these agents have demonstrated acceptable safety profiles in pregnancy when weighed against the substantial maternal and fetal risks associated with untreated moderate to severe depression. The literature consistently demonstrates that unmanaged maternal depression  correlates with adverse obstetrical and  outcomes that frequently outweigh the potential risks of medication exposure.     To optimize monitoring, we recommend systematic mood assessment utilizing the Pope  Depression Scale (EPDS) at minimum once per trimester and at critical postpartum intervals (2 and 6 weeks), with additional screening if clinically indicated.    Advanced maternal age    We discussed that advanced maternal age (AMA) is defined as maternal age greater than or equal to 35 at the time of anticipated delivery. This age cutoff was selected due to the increasing risk of chromosomal abnormalities and possible congenital malformations noted in children born to women aged 35 or older, acknowledging that this risk is based on a continuum with increasing risk with increasing age. We reviewed that increasing maternal age can also be associated with development of chronic medical conditions such as diabetes, hypertension, and obesity which may increase the risk of adverse pregnancy outcomes. Pregnancy with anticipated delivery at 35 years or older is also associated with increased risks of miscarriage, aneuploidy, gestational diabetes, hypertensive disorders of pregnancy, fetal growth restriction,  birth (both iatrogenic and spontaneous), stillbirth, and  delivery. These risks also exist on a continuum with risk increasing progressively with advancing age, especially in patients older than 40.    Additionally, we would recommend initiation of low dose (81mg) aspirin for risk reduction of hypertensive disorders of pregnancy (HDP) in the setting of additional risk factors. In women with multiple moderate risk factors (nulliparity, obesity, black race, age > 35, IVF, family history of preeclampsia) low-dose aspirin may reduce the risk by up to 25%.     Obesity     Pregnancy complications are increased in patients with obesity, even in the absence of additional comorbidity.   "These risks include, but are not limited to, gestational diabetes, hypertensive disorders of pregnancy, depression and anxiety, VTE,  birth, failed induction of labor,  delivery, operative delivery, postpartum hemorrhage, postoperative infection, subfertility, miscarriage, congenital anomaly, stillbirth, large for gestational age, shoulder dystocia, postpartum weight retention, and difficulty with breastfeeding.  The risk of antepartum stillbirth increases with increasing BMI and gestational age. Women with BMI >= 30 are less likely than their normal weight counterparts to enter spontaneous labor.  For these reasons, determining the optimal timing of delivery for these patients becomes a balance of decreasing preventable stillbirth risk with minimizing  delivery risk.  This balance is further complicated by the fact that women with BMI >= 30 are more likely to have a failed induction of labor than normal weight women.    Obstructive sleep apnea, a serious condition which is tied to metabolic syndrome and its associated conditions, is common in the obstetric population with an incidence of 11-20%. Pregnant women with a BMI >= 30 are 13 times more likely to be diagnosed with obstructive sleep apnea than their normal weight counterparts.  Although the risks of YULIANA are often associated with health problems later in life, multiple studies have shown an increased risk for severe  morbidity and mortality, both maternal and fetal/.  These risks include pre-eclampsia, gestational diabetes, post-operative wound complications, acute renal failure, pulmonary edema, pulmonary emboli (OR 14), peripartum cardiomyopathy (OR 19), stroke, and in hospital death (OR 7), and a trend toward fetal growth restriction.  It has been recommended in the literature to screen all women with BMI >=30 for symptoms of sleep apnea. The pregnancy specific model and STOP questions (\"Do you Snore loudly?, Do you " feel Tired, fatigued, or sleepy during the daytime?, Has anyone Observed you stop breathing during your sleep?, Do you have or are you being treated for high blood Pressure?) have reasonable efficacy as screening tool.    Recommendations     Genetic Screening/Testing  - S/p low risk cell free DNA     Medications  - Continue prenatal vitamins with folic acid  - Continue fluoxetine (Prozac) for depression management  - Initiate low-dose aspirin (81mg daily) for preeclampsia prevention given multiple moderate risk factors (AMA and obesity)     Laboratory evaluation  - Continue routine prenatal labs including diabetes screening at 24-28 weeks    Maternal antepartum management  - Follow-up with established cardiology care with electrophysiology specialist as well as for pacemaker interrogation/checks    - Consider echocardiogram in current pregnancy to assess cardiac function  - EPDS screening for depression each trimester and at postpartum visits  - Anesthesia consultation in the third trimester to review neuraxial anesthesia administration and pacemaker   - Avoidance of terbutaline for tocolysis given its pro-arrhythmia side effect     Ultrasound surveillance  - No follow-up ultrasound indicated  - Has fetal echocardiogram scheduled on 6/10/2025    Timing and mode of delivery  - No contraindications to vaginal delivery. Reserve  delivery for routine obstetrical indications.  - Delivery by 39 0/7-40 0/7 weeks gestational age  - Reserve cardiac monitoring for symptoms e.g. ECG and/or telemetry  - Maintain euvolemic status  - Alert anesthesia team about pacemaker if epidural is desired; monitor for hypotension  - If urgent/emergent  delivery is needed, surgical team should be aware of pacemaker placement; use bipolar cautery if possible, with grounding pad positioned away from pacemaker, and deactivation of pacemaker   - Avoidance of methergine as a uterotonic due to its pro-arrhythmia side effect      Postpartum management   - Schedule pacemaker interrogation within 2 weeks postpartum to readjust settings as cardiovascular system returns to pre-pregnancy state  - Close monitoring for postpartum depression with EPDS at 2 weeks and 6 weeks postpartum visits  - Contraception counseling at postpartum visit; no contraindications to estrogen containing contraceptive methods unless breast feeding    The patient was seen and evaluated with Dr. Lizeth Bennett.    Thank you for allowing us to participate in the care of your patient. Please do not hesitate to contact us if you have further questions regarding the management of your patient.     Vilma Zarate MD  Maternal Fetal Medicine Fellow       At the end of our discussion, Rosana Flores indicated that her questions were answered and she seemed satisfied with our discussion.  Thank you for allowing us to participate in the care of your patient. Please do not hesitate to contact us if you have further questions regarding the management of your patient.     Lizeth Bennett MD  Specialist in Maternal-Fetal Medicine       I have seen and evaluated the patient with Dr. Dr. Zarate. I reviewed her chart and agree with the above documented assessment and plan. I spent a total of 45 minutes on the date of this encounter (excluding interpretation of the ultrasound) including preparing to see the patient (reviewing medical records/tests), counseling and discussing the plan of care, documenting the visit in the electronic medical record, and communicating with other health care professionals and/or care coordination.Please see his note for specific details; I have made the necessary edits/additions.

## 2025-05-13 ENCOUNTER — HOSPITAL ENCOUNTER (OUTPATIENT)
Dept: ULTRASOUND IMAGING | Facility: CLINIC | Age: 40
Discharge: HOME | End: 2025-05-13
Attending: OBSTETRICS & GYNECOLOGY
Payer: COMMERCIAL

## 2025-05-13 ENCOUNTER — OFFICE VISIT (OUTPATIENT)
Dept: MATERNAL FETAL MEDICINE | Facility: CLINIC | Age: 40
End: 2025-05-13
Attending: OBSTETRICS & GYNECOLOGY
Payer: COMMERCIAL

## 2025-05-13 VITALS
OXYGEN SATURATION: 100 % | DIASTOLIC BLOOD PRESSURE: 61 MMHG | HEIGHT: 63 IN | SYSTOLIC BLOOD PRESSURE: 99 MMHG | HEART RATE: 80 BPM | WEIGHT: 197.19 LBS | BODY MASS INDEX: 34.94 KG/M2

## 2025-05-13 DIAGNOSIS — O26.92 PREGNANCY RELATED CONDITION IN SECOND TRIMESTER: ICD-10-CM

## 2025-05-13 DIAGNOSIS — O26.90 PREGNANCY RELATED CONDITION: ICD-10-CM

## 2025-05-13 PROCEDURE — 76811 OB US DETAILED SNGL FETUS: CPT

## 2025-05-13 PROCEDURE — G0463 HOSPITAL OUTPT CLINIC VISIT: HCPCS | Mod: 25 | Performed by: OBSTETRICS & GYNECOLOGY

## 2025-05-13 NOTE — NURSING NOTE
Roasna accompanied by her two daughters, is here for MFM L2/consult at 18w4d related to history of AMA, Maternal hx complete AV block s/p pacemaker, Hx GDMA1, BMI >30. Medications and allergies reviewed. Endorses + FM. Denies LOF, Ctx, or VB. Dr. Bennett and Dr. Zarate met with patient to discuss plan. Patient discharged stable and ambulatory.

## 2025-05-17 ENCOUNTER — APPOINTMENT (OUTPATIENT)
Dept: ULTRASOUND IMAGING | Facility: CLINIC | Age: 40
End: 2025-05-17
Attending: EMERGENCY MEDICINE
Payer: COMMERCIAL

## 2025-05-17 ENCOUNTER — HEALTH MAINTENANCE LETTER (OUTPATIENT)
Age: 40
End: 2025-05-17

## 2025-05-17 ENCOUNTER — HOSPITAL ENCOUNTER (EMERGENCY)
Facility: CLINIC | Age: 40
Discharge: HOME | End: 2025-05-17
Attending: EMERGENCY MEDICINE | Admitting: EMERGENCY MEDICINE
Payer: COMMERCIAL

## 2025-05-17 VITALS
BODY MASS INDEX: 33.84 KG/M2 | SYSTOLIC BLOOD PRESSURE: 104 MMHG | TEMPERATURE: 97.4 F | HEIGHT: 63 IN | HEART RATE: 91 BPM | OXYGEN SATURATION: 96 % | RESPIRATION RATE: 16 BRPM | DIASTOLIC BLOOD PRESSURE: 56 MMHG | WEIGHT: 191 LBS

## 2025-05-17 DIAGNOSIS — N39.0 URINARY TRACT INFECTION WITHOUT HEMATURIA, SITE UNSPECIFIED: ICD-10-CM

## 2025-05-17 LAB
ALBUMIN UR-MCNC: NEGATIVE MG/DL
AMORPH CRY #/AREA URNS HPF: ABNORMAL /HPF
ANION GAP SERPL CALCULATED.3IONS-SCNC: 12 MMOL/L (ref 7–15)
APPEARANCE UR: ABNORMAL
BACTERIA #/AREA URNS HPF: ABNORMAL /HPF
BASOPHILS # BLD AUTO: 0 10E3/UL (ref 0–0.2)
BASOPHILS NFR BLD AUTO: 0 %
BILIRUB UR QL STRIP: NEGATIVE
BUN SERPL-MCNC: 7.1 MG/DL (ref 6–20)
CALCIUM SERPL-MCNC: 8.6 MG/DL (ref 8.8–10.4)
CHLORIDE SERPL-SCNC: 105 MMOL/L (ref 98–107)
COLOR UR AUTO: ABNORMAL
CREAT SERPL-MCNC: 0.63 MG/DL (ref 0.51–0.95)
EGFRCR SERPLBLD CKD-EPI 2021: >90 ML/MIN/1.73M2
EOSINOPHIL # BLD AUTO: 0.1 10E3/UL (ref 0–0.7)
EOSINOPHIL NFR BLD AUTO: 1 %
ERYTHROCYTE [DISTWIDTH] IN BLOOD BY AUTOMATED COUNT: 12.7 % (ref 10–15)
GLUCOSE SERPL-MCNC: 151 MG/DL (ref 70–99)
GLUCOSE UR STRIP-MCNC: NEGATIVE MG/DL
HCO3 SERPL-SCNC: 18 MMOL/L (ref 22–29)
HCT VFR BLD AUTO: 31.6 % (ref 35–47)
HGB BLD-MCNC: 11.1 G/DL (ref 11.7–15.7)
HGB UR QL STRIP: NEGATIVE
IMM GRANULOCYTES # BLD: 0.1 10E3/UL
IMM GRANULOCYTES NFR BLD: 0 %
KETONES UR STRIP-MCNC: NEGATIVE MG/DL
LEUKOCYTE ESTERASE UR QL STRIP: ABNORMAL
LYMPHOCYTES # BLD AUTO: 0.5 10E3/UL (ref 0.8–5.3)
LYMPHOCYTES NFR BLD AUTO: 4 %
MCH RBC QN AUTO: 30 PG (ref 26.5–33)
MCHC RBC AUTO-ENTMCNC: 35.1 G/DL (ref 31.5–36.5)
MCV RBC AUTO: 85 FL (ref 78–100)
MONOCYTES # BLD AUTO: 0.2 10E3/UL (ref 0–1.3)
MONOCYTES NFR BLD AUTO: 2 %
MUCOUS THREADS #/AREA URNS LPF: PRESENT /LPF
NEUTROPHILS # BLD AUTO: 10.5 10E3/UL (ref 1.6–8.3)
NEUTROPHILS NFR BLD AUTO: 93 %
NITRATE UR QL: NEGATIVE
NRBC # BLD AUTO: 0 10E3/UL
NRBC BLD AUTO-RTO: 0 /100
PH UR STRIP: 7 [PH] (ref 5–7)
PLATELET # BLD AUTO: 153 10E3/UL (ref 150–450)
POTASSIUM SERPL-SCNC: 3.6 MMOL/L (ref 3.4–5.3)
RBC # BLD AUTO: 3.7 10E6/UL (ref 3.8–5.2)
RBC URINE: 5 /HPF
SODIUM SERPL-SCNC: 135 MMOL/L (ref 135–145)
SP GR UR STRIP: 1.01 (ref 1–1.03)
SQUAMOUS EPITHELIAL: 4 /HPF
UROBILINOGEN UR STRIP-MCNC: NORMAL MG/DL
WBC # BLD AUTO: 11.3 10E3/UL (ref 4–11)
WBC URINE: 21 /HPF

## 2025-05-17 PROCEDURE — 250N000011 HC RX IP 250 OP 636: Performed by: EMERGENCY MEDICINE

## 2025-05-17 PROCEDURE — 80048 BASIC METABOLIC PNL TOTAL CA: CPT | Performed by: EMERGENCY MEDICINE

## 2025-05-17 PROCEDURE — 96365 THER/PROPH/DIAG IV INF INIT: CPT

## 2025-05-17 PROCEDURE — 81003 URINALYSIS AUTO W/O SCOPE: CPT | Performed by: EMERGENCY MEDICINE

## 2025-05-17 PROCEDURE — 99285 EMERGENCY DEPT VISIT HI MDM: CPT | Mod: 25

## 2025-05-17 PROCEDURE — 76770 US EXAM ABDO BACK WALL COMP: CPT

## 2025-05-17 PROCEDURE — 85025 COMPLETE CBC W/AUTO DIFF WBC: CPT | Performed by: EMERGENCY MEDICINE

## 2025-05-17 PROCEDURE — 36415 COLL VENOUS BLD VENIPUNCTURE: CPT | Performed by: EMERGENCY MEDICINE

## 2025-05-17 PROCEDURE — 76815 OB US LIMITED FETUS(S): CPT

## 2025-05-17 PROCEDURE — 87186 SC STD MICRODIL/AGAR DIL: CPT | Performed by: EMERGENCY MEDICINE

## 2025-05-17 RX ORDER — CEFPODOXIME PROXETIL 100 MG/1
100 TABLET, FILM COATED ORAL 2 TIMES DAILY
Qty: 14 TABLET | Refills: 0 | Status: SHIPPED | OUTPATIENT
Start: 2025-05-17 | End: 2025-05-17

## 2025-05-17 RX ORDER — CEFTRIAXONE 1 G/1
1 INJECTION, POWDER, FOR SOLUTION INTRAMUSCULAR; INTRAVENOUS ONCE
Status: COMPLETED | OUTPATIENT
Start: 2025-05-17 | End: 2025-05-17

## 2025-05-17 RX ORDER — CEFPODOXIME PROXETIL 100 MG/1
100 TABLET, FILM COATED ORAL 2 TIMES DAILY
Qty: 14 TABLET | Refills: 0 | Status: SHIPPED | OUTPATIENT
Start: 2025-05-17

## 2025-05-17 RX ADMIN — CEFTRIAXONE 1 G: 1 INJECTION, POWDER, FOR SOLUTION INTRAMUSCULAR; INTRAVENOUS at 05:37

## 2025-05-17 ASSESSMENT — COLUMBIA-SUICIDE SEVERITY RATING SCALE - C-SSRS
1. IN THE PAST MONTH, HAVE YOU WISHED YOU WERE DEAD OR WISHED YOU COULD GO TO SLEEP AND NOT WAKE UP?: NO
2. HAVE YOU ACTUALLY HAD ANY THOUGHTS OF KILLING YOURSELF IN THE PAST MONTH?: NO
6. HAVE YOU EVER DONE ANYTHING, STARTED TO DO ANYTHING, OR PREPARED TO DO ANYTHING TO END YOUR LIFE?: NO

## 2025-05-17 ASSESSMENT — ACTIVITIES OF DAILY LIVING (ADL)
ADLS_ACUITY_SCORE: 41

## 2025-05-17 NOTE — ED NOTES
EMERGENCY DEPARTMENT PROGRESS NOTE         ED COURSE AND MEDICAL DECISION MAKING  Patient was signed out to me by Dr. Seema Vasquez at 0515      Rosana Flores is a 39 year old female who presents with feelings of a uti at 19 wks EGA pregnancy.  She has had some flank discomfort with it.  No significant vomiting at home, vitally stable, and otherwise healthy.  She reported some spotting towards the end of her care, so ultrasound ordered to follow up on her known subchorionic hemorrhage, and to look at kidneys to make sure no significant hydronephrosis that would suggest an associated urolithiasis that would alter treatment.  If these are unremarkable suspect will be well enough to go home.  She received first dose of antibiotics here.    Her ultrasounds are reviewed nad there is no residual subconjunctival hemorrhage, but there is a minor anomaly that will need further follow up with her ob/gyn for genetic testing though it is noted it can be a normal variant.      Renal ultrasound does not suggest associated stone, and the patient is comfortable with discharge.     Medications   cefTRIAXone (ROCEPHIN) 1 g vial to attach to  mL bag for ADULTS or NS 50 mL bag for PEDS (1 g Intravenous $New Bag 5/17/25 0537)     New Prescriptions    CEFPODOXIME (VANTIN) 100 MG TABLET    Take 1 tablet (100 mg) by mouth 2 times daily.       LAB  Pertinent labs results reviewed   Results for orders placed or performed during the hospital encounter of 05/17/25   US OB >14 Weeks Limited wo Fetal Measurement     Status: None    Narrative    EXAM: US OB LIMITED >14 WEEKS WO FETAL MEASUREMENT  LOCATION: [facility]  DATE: 5/17/2025    INDICATION: Vaginal bleeding in pregnancy  COMPARISON: 5/13/2025.  TECHNIQUE: Transabdominal and endovaginal ultrasound.    FINDINGS:    Single fetus, variable presentation. Tiny echogenic focus noted within the fetal left cardiac ventricle.    HEART RATE: 150 bpm.  SDP: 3.5  cm.  PLACENTA: Anterior. Intact. No previa.  CERVIX: Closed with length of 6.0 cm.    RIGHT OVARY: Normal  LEFT OVARY: Corpus luteum. Otherwise normal.      Impression    IMPRESSION:  1.  Single living intrauterine gestation in variable position.  2.  Echogenic focus of the fetal left ventricle is a commonly seen normal variant. Some feel this is a low likelihood marker of aneuploidy. Clinical correlation recommended.  3.  Satisfactory volume of amniotic fluid.  4.  Closed cervix with length of 6.0 cm.     US Renal Complete Non-Vascular     Status: None    Narrative    EXAM: US RENAL COMPLETE NON-VASCULAR  LOCATION: [facility]  DATE: 5/17/2025    INDICATION: Left flank pain in pregnancy  COMPARISON: None.  TECHNIQUE: Routine Bilateral Renal and Bladder Ultrasound.    FINDINGS:    RIGHT KIDNEY: 10.7 x 3.8 x 4.7 cm. Normal without hydronephrosis or masses.     LEFT KIDNEY: 10.4 x 4.3 x 5.2 cm. Normal without hydronephrosis or masses. Possible 0.9 x 1.5 x 0.3 cm stone in the lower pole.    BLADDER: Normal.      Impression    IMPRESSION:  1.  No hydronephrosis. Possible left renal stone.   UA with Microscopic reflex to Culture     Status: Abnormal    Specimen: Urine, Clean Catch   Result Value Ref Range    Color Urine Light Yellow Colorless, Straw, Light Yellow, Yellow    Appearance Urine Turbid (A) Clear    Glucose Urine Negative Negative mg/dL    Bilirubin Urine Negative Negative    Ketones Urine Negative Negative mg/dL    Specific Gravity Urine 1.012 1.001 - 1.030    Blood Urine Negative Negative    pH Urine 7.0 5.0 - 7.0    Protein Albumin Urine Negative Negative mg/dL    Urobilinogen Urine Normal Normal mg/dL    Nitrite Urine Negative Negative    Leukocyte Esterase Urine 250 Yaniv/uL (A) Negative    Bacteria Urine Few (A) None Seen /HPF    Mucus Urine Present (A) None Seen /LPF    Amorphous Crystals Urine Few (A) None Seen /HPF    RBC Urine 5 (H) <=2 /HPF    WBC Urine 21 (H) <=5 /HPF     Squamous Epithelials Urine 4 (H) <=1 /HPF    Narrative    Urine Culture ordered based on laboratory criteria   Basic metabolic panel     Status: Abnormal   Result Value Ref Range    Sodium 135 135 - 145 mmol/L    Potassium 3.6 3.4 - 5.3 mmol/L    Chloride 105 98 - 107 mmol/L    Carbon Dioxide (CO2) 18 (L) 22 - 29 mmol/L    Anion Gap 12 7 - 15 mmol/L    Urea Nitrogen 7.1 6.0 - 20.0 mg/dL    Creatinine 0.63 0.51 - 0.95 mg/dL    GFR Estimate >90 >60 mL/min/1.73m2    Calcium 8.6 (L) 8.8 - 10.4 mg/dL    Glucose 151 (H) 70 - 99 mg/dL   CBC with platelets and differential     Status: Abnormal   Result Value Ref Range    WBC Count 11.3 (H) 4.0 - 11.0 10e3/uL    RBC Count 3.70 (L) 3.80 - 5.20 10e6/uL    Hemoglobin 11.1 (L) 11.7 - 15.7 g/dL    Hematocrit 31.6 (L) 35.0 - 47.0 %    MCV 85 78 - 100 fL    MCH 30.0 26.5 - 33.0 pg    MCHC 35.1 31.5 - 36.5 g/dL    RDW 12.7 10.0 - 15.0 %    Platelet Count 153 150 - 450 10e3/uL    % Neutrophils 93 %    % Lymphocytes 4 %    % Monocytes 2 %    % Eosinophils 1 %    % Basophils 0 %    % Immature Granulocytes 0 %    NRBCs per 100 WBC 0 <1 /100    Absolute Neutrophils 10.5 (H) 1.6 - 8.3 10e3/uL    Absolute Lymphocytes 0.5 (L) 0.8 - 5.3 10e3/uL    Absolute Monocytes 0.2 0.0 - 1.3 10e3/uL    Absolute Eosinophils 0.1 0.0 - 0.7 10e3/uL    Absolute Basophils 0.0 0.0 - 0.2 10e3/uL    Absolute Immature Granulocytes 0.1 <=0.4 10e3/uL    Absolute NRBCs 0.0 10e3/uL   CBC with platelets differential     Status: Abnormal    Narrative    The following orders were created for panel order CBC with platelets differential.  Procedure                               Abnormality         Status                     ---------                               -----------         ------                     CBC with platelets and ...[[id number]]  Abnormal            Final result               RBC and Platelet Morpho...[[id number]]                                                   Please view results for these tests on  the individual orders.         RADIOLOGY    Pertinent imaging reviewed   Please see official radiology report.  US Renal Complete Non-Vascular   Final Result   IMPRESSION:   1.  No hydronephrosis. Possible left renal stone.      US OB >14 Weeks Limited wo Fetal Measurement   Final Result   IMPRESSION:   1.  Single living intrauterine gestation in variable position.   2.  Echogenic focus of the fetal left ventricle is a commonly seen normal variant. Some feel this is a low likelihood marker of aneuploidy. Clinical correlation recommended.   3.  Satisfactory volume of amniotic fluid.   4.  Closed cervix with length of 6.0 cm.             FINAL IMPRESSION    1. Urinary tract infection without hematuria, site unspecified              Nadia Westfall MD  05/17/25 0600

## 2025-05-17 NOTE — DISCHARGE INSTRUCTIONS
Your next dose of the antibiotic should be tonight with dinnertime.    Keep well hydrated.    Return to the ER if you are too weak, or not able to keep down your medication due to nausea.    You have a minor abnormality on your ultrasound that needs follow up with your ob/gyn - it can be a normal finding on many ultrasounds, but you may consider genetic testing if that hasn't already been done.  Discuss with your ob/gyn on your next visit.

## 2025-05-17 NOTE — Clinical Note
Rosana Flores was seen and treated in our emergency department on 5/17/2025.         Sincerely,     [facility]

## 2025-05-17 NOTE — ED TRIAGE NOTES
PT is coming in tonight with symptoms of what she feels is a UTI. Pt has been having increase in urination and LT sided flank pain. No OTC medication PTA.      Triage Assessment (Adult)       Row Name 05/17/25 0208          Triage Assessment    Airway WDL WDL        Respiratory WDL    Respiratory WDL WDL        Skin Circulation/Temperature WDL    Skin Circulation/Temperature WDL WDL        Cardiac WDL    Cardiac WDL WDL        Peripheral/Neurovascular WDL    Peripheral Neurovascular WDL WDL        Cognitive/Neuro/Behavioral WDL    Cognitive/Neuro/Behavioral WDL WDL

## 2025-05-17 NOTE — ED PROVIDER NOTES
EMERGENCY DEPARTMENT ENCOUNTER      NAME: Rosana Flores  AGE: 39 year old female  YOB: 1985  MRN: [id number]  EVALUATION DATE & TIME: 5/17/2025  2:49 AM    PCP: System, Provider Not In    ED PROVIDER: Kimberly Vasquez DO      Chief Complaint   Patient presents with    Flank Pain         FINAL IMPRESSION:  1. Urinary tract infection without hematuria, site unspecified          ED COURSE & MEDICAL DECISION MAKING:    Pertinent Labs & Imaging studies reviewed. (See chart for details)  5:18 AM I met the patient and performed my initial interview and exam.    39 year old female presents to the Emergency Department for evaluation of UTI symptoms, left flank pain.  She is 19 weeks pregnant.  She has been feeling fetal movement.  No loss of fluid, or cramping.  She is nontoxic-appearing.  Notes some left CVA tenderness.  UA suspicious for infection.  She does not look toxic.  Lab work with mild leukocytosis and anemia commonly seen in pregnancy.  Normal kidney function.  She reports she did pass a small amount of blood that was darker appearing the other day, related to her prior subchorionic hematoma.  Will cover for infection with ceftriaxone here and discharged with cefpodoxime.  Pending ultrasound of baby and left kidney prior to disposition at time of handoff.  At the conclusion of the encounter I discussed the results of all of the tests and the disposition. The questions were answered. The patient or family acknowledged understanding and was agreeable with the care plan.     Medical Decision Making      Admission considered. Patient was signed out to the oncoming physician, disposition pending.    MIPS (CTPE, Dental pain, Fernandez, Sinusitis, Asthma/COPD, Head Trauma): Not Applicable    SEPSIS: None      MEDICATIONS GIVEN IN THE EMERGENCY:  Medications   cefTRIAXone (ROCEPHIN) 1 g vial to attach to  mL bag for ADULTS or NS 50 mL bag for PEDS (has no administration in time range)       NEW  PRESCRIPTIONS STARTED AT TODAY'S ER VISIT  New Prescriptions    CEFPODOXIME (VANTIN) 100 MG TABLET    Take 1 tablet (100 mg) by mouth 2 times daily.          =================================================================    HPI    Patient information was obtained from: Patient    Use of : N/A         Rosana Flores is a 39 year old female with a pertinent history of ADHD, 2nd degree AV block who presents to this ED for evaluation of GI symptoms.  Patient is 19 weeks pregnant.  Reports burning with urination.  Now with chills and some left flank pain.  She has not tried anything for pain.  Is felt baby move.  Recently saw [facility] for an anatomy scan, could not see all 4 chambers of the heart otherwise unremarkable.  Patient does tell me that she has a subchorionic hematoma that is not completely resolved.  She had a couple episodes of blood with wiping.  No vomiting.  No fevers.  No concerning vaginal discharge.  No trauma.    Patient seen by [facility] on 5/13/2025.  No fetal abnormalities commonly detected by ultrasound were identified in the detailed fetal anatomic survey, however some views are suboptimal due to fetal position.      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    SOCIAL HISTORY:   Social History     Socioeconomic History    Marital status: Single   Tobacco Use    Smoking status: Never    Smokeless tobacco: Never   Substance and Sexual Activity    Alcohol use: No    Drug use: No    Sexual activity: Yes     Partners: Male     Social Drivers of Health      Received from [employer] [facility]    Financial Resource Strain    Received from [employer] [facility]    Social Connections       VITALS:  /77   Pulse 93   Temp 97.4  F (36.3  C) (Oral)   Resp 16   Ht 1.6 m (5' 3\")   Wt 86.6 kg (191 lb)   LMP 12/10/2024   SpO2 99%   BMI 33.83 kg/m      PHYSICAL EXAM    Physical Exam  Constitutional:       General: She is not in acute distress.  HENT:      Head: Normocephalic and atraumatic.      Mouth/Throat:      Pharynx: Oropharynx is clear.   Eyes:      Pupils: Pupils are equal, round, and reactive to light.   Cardiovascular:      Rate and Rhythm: Normal rate and regular rhythm.      Pulses: Normal pulses.      Heart sounds: Normal heart sounds.   Pulmonary:      Effort: Pulmonary effort is normal.      Breath sounds: Normal breath sounds.   Abdominal:      General: Abdomen is flat. Bowel sounds are normal.      Palpations: Abdomen is soft.      Tenderness: There is no abdominal tenderness. There is left CVA tenderness.   Musculoskeletal:         General: Normal range of motion.   Skin:     General: Skin is warm and dry.      Capillary Refill: Capillary refill takes less than 2 seconds.   Neurological:      General: No focal deficit present.      Mental Status: She is alert and oriented to person, place, and time.             LAB:  All pertinent labs reviewed and interpreted.  Labs Ordered and Resulted from Time of ED Arrival to Time of ED Departure   ROUTINE UA WITH " MICROSCOPIC REFLEX TO CULTURE - Abnormal       Result Value    Color Urine Light Yellow      Appearance Urine Turbid (*)     Glucose Urine Negative      Bilirubin Urine Negative      Ketones Urine Negative      Specific Gravity Urine 1.012      Blood Urine Negative      pH Urine 7.0      Protein Albumin Urine Negative      Urobilinogen Urine Normal      Nitrite Urine Negative      Leukocyte Esterase Urine 250 Yaniv/uL (*)     Bacteria Urine Few (*)     Mucus Urine Present (*)     Amorphous Crystals Urine Few (*)     RBC Urine 5 (*)     WBC Urine 21 (*)     Squamous Epithelials Urine 4 (*)    BASIC METABOLIC PANEL - Abnormal    Sodium 135      Potassium 3.6      Chloride 105      Carbon Dioxide (CO2) 18 (*)     Anion Gap 12      Urea Nitrogen 7.1      Creatinine 0.63      GFR Estimate >90      Calcium 8.6 (*)     Glucose 151 (*)    CBC WITH PLATELETS AND DIFFERENTIAL - Abnormal    WBC Count 11.3 (*)     RBC Count 3.70 (*)     Hemoglobin 11.1 (*)     Hematocrit 31.6 (*)     MCV 85      MCH 30.0      MCHC 35.1      RDW 12.7      Platelet Count 153      % Neutrophils 93      % Lymphocytes 4      % Monocytes 2      % Eosinophils 1      % Basophils 0      % Immature Granulocytes 0      NRBCs per 100 WBC 0      Absolute Neutrophils 10.5 (*)     Absolute Lymphocytes 0.5 (*)     Absolute Monocytes 0.2      Absolute Eosinophils 0.1      Absolute Basophils 0.0      Absolute Immature Granulocytes 0.1      Absolute NRBCs 0.0     URINE CULTURE       RADIOLOGY:  Reviewed all pertinent imaging. Please see official radiology report.  US OB >14 Weeks Limited wo Fetal Measurement    (Results Pending)   US Renal Complete Non-Vascular    (Results Pending)         Kimberly Vasquez DO  Emergency Medicine  [facility] EMERGENCY ROOM  [address] [facility] [address] [phone]  Dept: [phone]       Kimberly Vasquez DO  05/17/25 7142

## 2025-05-18 LAB — BACTERIA UR CULT: ABNORMAL

## 2025-05-19 ENCOUNTER — TELEPHONE (OUTPATIENT)
Dept: NURSING | Facility: CLINIC | Age: 40
End: 2025-05-19
Payer: COMMERCIAL

## 2025-05-19 NOTE — TELEPHONE ENCOUNTER
[facility]     Reason for call: Lab Result Notification     Lab Result (including Rx patient on, if applicable).  If culture, copy of lab report at bottom.  Lab Result: Final urine culture  ED RX = cefpodoxime (VANTIN) 100 MG tablet 2 times daily. For 7 days. Susceptible   Creatinine Level (mg/dl)   Creatinine   Date Value Ref Range Status   05/17/2025 0.63 0.51 - 0.95 mg/dL Final    Creatinine clearance (ml/min), if applicable    Serum creatinine: 0.63 mg/dL 05/17/25 0421  Estimated creatinine clearance: 126.3 mL/min     Patient's current Symptoms:   Rsoana reports less pain and tolerating fluids. She is nauseous and has an appt with OB/GYN tomorrow.  ED symptoms = left flank pain and pregnant 19 weeks.  RN Recommendations/Instructions per [facility] ED lab result protocol:   Continue antibiotic/medication as prescribed: Vantin  Return to the ER if you are too weak, or not able to keep down your  medication due to nausea  Patient/care giver notified to contact your PCP clinic or return to the Emergency department if your:  Symptoms return.  Symptoms do not improve after 3 days on antibiotic.  Symptoms do not resolve after completing antibiotic.  Symptoms worsen or other concerning symptoms.    Anastasia Brown, RN

## 2025-06-10 ENCOUNTER — HOSPITAL ENCOUNTER (OUTPATIENT)
Dept: CARDIOLOGY | Facility: CLINIC | Age: 40
Discharge: HOME | End: 2025-06-10
Attending: OBSTETRICS & GYNECOLOGY
Payer: COMMERCIAL

## 2025-06-10 ENCOUNTER — OFFICE VISIT (OUTPATIENT)
Dept: CARDIOLOGY | Facility: CLINIC | Age: 40
End: 2025-06-10
Payer: COMMERCIAL

## 2025-06-10 DIAGNOSIS — O35.BXX0 FETAL CARDIAC DISEASE AFFECTING PREGNANCY, SINGLE OR UNSPECIFIED FETUS: Primary | ICD-10-CM

## 2025-06-10 DIAGNOSIS — O26.90 PREGNANCY RELATED CONDITION: ICD-10-CM

## 2025-06-10 PROCEDURE — 93325 DOPPLER ECHO COLOR FLOW MAPG: CPT | Mod: 26 | Performed by: PEDIATRICS

## 2025-06-10 PROCEDURE — 76825 ECHO EXAM OF FETAL HEART: CPT | Mod: 26 | Performed by: PEDIATRICS

## 2025-06-10 PROCEDURE — 76827 ECHO EXAM OF FETAL HEART: CPT

## 2025-06-10 PROCEDURE — 76827 ECHO EXAM OF FETAL HEART: CPT | Mod: 26 | Performed by: PEDIATRICS

## 2025-06-10 NOTE — PROGRESS NOTES
Fetal Cardiology Consultation    Patient:  Rosana Flores MRN:  [id number]   YOB: 1985 Age:  39 year old   Date of Visit:  6/10/2025 PCP:  System, Provider Not In   TRISHA: 10/10/2025, by Ultrasound EGA: 22w4d weeks     Dear Doctor,     I had the pleasure of seeing Rosana Flores at the [facility] in [place] on 6/10/2025 in consultation for fetal echocardiography results. She presented today by herself. As you know, she is a 39 year old female with maternal complete heart block and history of gestational diabetes in previous pregnancy.    The fetal echocardiogram was normal. Normal fetal cardiac anatomy. Normal right and left ventricular size and function without hypertrophy. No evidence of diastolic dysfunction. No pericardial effusion. No arrhythmia.     I reviewed and interpreted the fetal echocardiogram today. I discussed the normal results with Ms. Flores. While these results are normal, it is important to note that fetal echocardiography cannot exclude small atrial or ventricular septal defects, persistent ductus arteriosus, mild coarctation of the aorta, partial anomalous pulmonary venous return, minor anatomic valve anomalies, or coronary artery anomalies.     -- No additional fetal echocardiograms are recommended for this pregnancy.    Thank you for allowing me to participate in Ms. Flores's care. Please don't hesitate to contact me or the Fetal Cardiology team at [facility] with any questions or concerns.     This visit was separate from the performance and interpretation of the ultrasound. The majority of the time (>50%) was spent in counseling and coordination of care. I spent approximately 20 minutes in face-to-face time reviewing the above considerations.    Silvano Kendall MD  Pediatric Cardiology  [facility]  Phone [phone]